# Patient Record
Sex: MALE | Race: WHITE | URBAN - METROPOLITAN AREA
[De-identification: names, ages, dates, MRNs, and addresses within clinical notes are randomized per-mention and may not be internally consistent; named-entity substitution may affect disease eponyms.]

---

## 2023-05-11 ENCOUNTER — TELEPHONE (OUTPATIENT)
Dept: CARDIOLOGY CLINIC | Age: 43
End: 2023-05-11

## 2023-05-24 NOTE — TELEPHONE ENCOUNTER
Phoned referring office, Dr. Samra Ceron to let them know that we have not been able to schedule patient after leaving several [email or web address]